# Patient Record
Sex: FEMALE | Race: WHITE | NOT HISPANIC OR LATINO | Employment: OTHER | URBAN - NONMETROPOLITAN AREA
[De-identification: names, ages, dates, MRNs, and addresses within clinical notes are randomized per-mention and may not be internally consistent; named-entity substitution may affect disease eponyms.]

---

## 2024-09-16 ENCOUNTER — OFFICE VISIT (OUTPATIENT)
Dept: CARDIOLOGY | Facility: CLINIC | Age: 74
End: 2024-09-16
Payer: MEDICARE

## 2024-09-16 VITALS
DIASTOLIC BLOOD PRESSURE: 64 MMHG | WEIGHT: 149 LBS | OXYGEN SATURATION: 95 % | BODY MASS INDEX: 27.42 KG/M2 | HEART RATE: 63 BPM | SYSTOLIC BLOOD PRESSURE: 138 MMHG | HEIGHT: 62 IN

## 2024-09-16 DIAGNOSIS — I10 ESSENTIAL HYPERTENSION: ICD-10-CM

## 2024-09-16 DIAGNOSIS — R00.2 PALPITATIONS: Primary | ICD-10-CM

## 2024-09-16 DIAGNOSIS — E78.2 MIXED HYPERLIPIDEMIA: ICD-10-CM

## 2024-09-16 DIAGNOSIS — R00.2 PALPITATIONS: ICD-10-CM

## 2024-09-16 DIAGNOSIS — I47.19 ATRIAL TACHYCARDIA: ICD-10-CM

## 2024-09-16 DIAGNOSIS — G89.29 CHRONIC CHEST PAIN WITH LOW TO MODERATE RISK FOR CAD: ICD-10-CM

## 2024-09-16 DIAGNOSIS — E03.9 HYPOTHYROIDISM (ACQUIRED): ICD-10-CM

## 2024-09-16 DIAGNOSIS — Z91.89 CHRONIC CHEST PAIN WITH LOW TO MODERATE RISK FOR CAD: ICD-10-CM

## 2024-09-16 DIAGNOSIS — R07.9 CHRONIC CHEST PAIN WITH LOW TO MODERATE RISK FOR CAD: ICD-10-CM

## 2024-09-16 PROCEDURE — 93000 ELECTROCARDIOGRAM COMPLETE: CPT | Performed by: INTERNAL MEDICINE

## 2024-09-16 PROCEDURE — 3075F SYST BP GE 130 - 139MM HG: CPT | Performed by: INTERNAL MEDICINE

## 2024-09-16 PROCEDURE — 3078F DIAST BP <80 MM HG: CPT | Performed by: INTERNAL MEDICINE

## 2024-09-16 PROCEDURE — 99204 OFFICE O/P NEW MOD 45 MIN: CPT | Performed by: INTERNAL MEDICINE

## 2024-09-16 RX ORDER — HYDROCODONE BITARTRATE AND ACETAMINOPHEN 7.5; 325 MG/1; MG/1
TABLET ORAL
COMMUNITY
Start: 2024-07-24

## 2024-09-16 RX ORDER — ALENDRONATE SODIUM 70 MG/1
70 TABLET ORAL
COMMUNITY
Start: 2024-07-24

## 2024-09-16 RX ORDER — MAGNESIUM OXIDE 400 MG/1
400 TABLET ORAL DAILY
COMMUNITY

## 2024-09-16 RX ORDER — DAPAGLIFLOZIN 10 MG/1
10 TABLET, FILM COATED ORAL DAILY
COMMUNITY
Start: 2024-09-03

## 2024-09-16 RX ORDER — LOSARTAN POTASSIUM 25 MG/1
25 TABLET ORAL DAILY
COMMUNITY
Start: 2024-07-11

## 2024-09-16 RX ORDER — INSULIN GLARGINE 100 [IU]/ML
20 INJECTION, SOLUTION SUBCUTANEOUS NIGHTLY
COMMUNITY
Start: 2024-07-25

## 2024-09-16 RX ORDER — CHOLECALCIFEROL (VITAMIN D3) 25 MCG
1000 TABLET ORAL DAILY
COMMUNITY

## 2024-09-16 RX ORDER — LEVOTHYROXINE SODIUM 50 UG/1
50 TABLET ORAL
COMMUNITY
Start: 2024-08-31

## 2024-09-16 RX ORDER — ATORVASTATIN CALCIUM 80 MG/1
80 TABLET, FILM COATED ORAL NIGHTLY
COMMUNITY
Start: 2024-08-01

## 2024-09-16 RX ORDER — SERTRALINE HYDROCHLORIDE 100 MG/1
100 TABLET, FILM COATED ORAL DAILY
COMMUNITY
Start: 2024-07-10

## 2024-09-16 RX ORDER — LINAGLIPTIN 5 MG/1
5 TABLET, FILM COATED ORAL DAILY
COMMUNITY
Start: 2024-07-01

## 2024-09-16 RX ORDER — TRAMADOL HYDROCHLORIDE 50 MG/1
50 TABLET ORAL
COMMUNITY
Start: 2024-07-24

## 2024-09-16 RX ORDER — GLIMEPIRIDE 2 MG/1
2 TABLET ORAL 2 TIMES DAILY
COMMUNITY
Start: 2024-08-11

## 2024-09-17 ENCOUNTER — PATIENT ROUNDING (BHMG ONLY) (OUTPATIENT)
Dept: CARDIOLOGY | Facility: CLINIC | Age: 74
End: 2024-09-17
Payer: MEDICARE

## 2024-10-11 ENCOUNTER — HOSPITAL ENCOUNTER (OUTPATIENT)
Dept: CARDIOLOGY | Facility: HOSPITAL | Age: 74
Discharge: HOME | End: 2024-10-11
Payer: MEDICARE

## 2024-10-11 ENCOUNTER — HOSPITAL ENCOUNTER (OUTPATIENT)
Dept: NUCLEAR MEDICINE | Facility: HOSPITAL | Age: 74
Discharge: HOME | End: 2024-10-11
Payer: MEDICARE

## 2024-10-11 DIAGNOSIS — R00.2 PALPITATIONS: ICD-10-CM

## 2024-10-11 DIAGNOSIS — Z91.89 CHRONIC CHEST PAIN WITH LOW TO MODERATE RISK FOR CAD: ICD-10-CM

## 2024-10-11 DIAGNOSIS — R07.9 CHRONIC CHEST PAIN WITH LOW TO MODERATE RISK FOR CAD: ICD-10-CM

## 2024-10-11 DIAGNOSIS — G89.29 CHRONIC CHEST PAIN WITH LOW TO MODERATE RISK FOR CAD: ICD-10-CM

## 2024-10-11 PROCEDURE — 0 TECHNETIUM SESTAMIBI: Performed by: INTERNAL MEDICINE

## 2024-10-11 PROCEDURE — 78452 HT MUSCLE IMAGE SPECT MULT: CPT

## 2024-10-11 PROCEDURE — 93306 TTE W/DOPPLER COMPLETE: CPT

## 2024-10-11 PROCEDURE — A9500 TC99M SESTAMIBI: HCPCS | Performed by: INTERNAL MEDICINE

## 2024-10-11 PROCEDURE — 93017 CV STRESS TEST TRACING ONLY: CPT

## 2024-10-11 PROCEDURE — 25010000002 REGADENOSON 0.4 MG/5ML SOLUTION: Performed by: INTERNAL MEDICINE

## 2024-10-11 RX ORDER — REGADENOSON 0.08 MG/ML
0.4 INJECTION, SOLUTION INTRAVENOUS
Status: COMPLETED | OUTPATIENT
Start: 2024-10-11 | End: 2024-10-11

## 2024-10-11 RX ADMIN — REGADENOSON 0.4 MG: 0.08 INJECTION, SOLUTION INTRAVENOUS at 09:27

## 2024-10-11 RX ADMIN — TECHNETIUM TC 99M SESTAMIBI 1 DOSE: 1 INJECTION INTRAVENOUS at 07:57

## 2024-10-11 RX ADMIN — TECHNETIUM TC 99M SESTAMIBI 1 DOSE: 1 INJECTION INTRAVENOUS at 09:27

## 2024-10-14 LAB
BH CV ECHO MEAS - ACS: 1.6 CM
BH CV ECHO MEAS - AO MAX PG: 8.1 MMHG
BH CV ECHO MEAS - AO MEAN PG: 4 MMHG
BH CV ECHO MEAS - AO ROOT DIAM: 2.7 CM
BH CV ECHO MEAS - AO V2 MAX: 142 CM/SEC
BH CV ECHO MEAS - AO V2 VTI: 34.5 CM
BH CV ECHO MEAS - AVA(I,D): 2.07 CM2
BH CV ECHO MEAS - EDV(CUBED): 79.5 ML
BH CV ECHO MEAS - EDV(MOD-SP2): 79.2 ML
BH CV ECHO MEAS - EDV(MOD-SP4): 65.1 ML
BH CV ECHO MEAS - EF(MOD-BP): 60.8 %
BH CV ECHO MEAS - EF(MOD-SP2): 62.5 %
BH CV ECHO MEAS - EF(MOD-SP4): 63.4 %
BH CV ECHO MEAS - ESV(CUBED): 22 ML
BH CV ECHO MEAS - ESV(MOD-SP2): 29.7 ML
BH CV ECHO MEAS - ESV(MOD-SP4): 23.8 ML
BH CV ECHO MEAS - FS: 34.9 %
BH CV ECHO MEAS - IVS/LVPW: 1 CM
BH CV ECHO MEAS - IVSD: 1.1 CM
BH CV ECHO MEAS - LA DIMENSION: 3.9 CM
BH CV ECHO MEAS - LAT PEAK E' VEL: 10 CM/SEC
BH CV ECHO MEAS - LV DIASTOLIC VOL/BSA (35-75): 38.6 CM2
BH CV ECHO MEAS - LV MASS(C)D: 162.9 GRAMS
BH CV ECHO MEAS - LV MAX PG: 3.9 MMHG
BH CV ECHO MEAS - LV MEAN PG: 2 MMHG
BH CV ECHO MEAS - LV SYSTOLIC VOL/BSA (12-30): 14.1 CM2
BH CV ECHO MEAS - LV V1 MAX: 98.8 CM/SEC
BH CV ECHO MEAS - LV V1 VTI: 22.7 CM
BH CV ECHO MEAS - LVIDD: 4.3 CM
BH CV ECHO MEAS - LVIDS: 2.8 CM
BH CV ECHO MEAS - LVOT AREA: 3.1 CM2
BH CV ECHO MEAS - LVOT DIAM: 2 CM
BH CV ECHO MEAS - LVPWD: 1.1 CM
BH CV ECHO MEAS - MED PEAK E' VEL: 7 CM/SEC
BH CV ECHO MEAS - MR MAX PG: 74.3 MMHG
BH CV ECHO MEAS - MR MAX VEL: 431 CM/SEC
BH CV ECHO MEAS - MV A MAX VEL: 91.7 CM/SEC
BH CV ECHO MEAS - MV DEC SLOPE: 489 CM/SEC2
BH CV ECHO MEAS - MV DEC TIME: 0.22 SEC
BH CV ECHO MEAS - MV E MAX VEL: 110 CM/SEC
BH CV ECHO MEAS - MV E/A: 1.2
BH CV ECHO MEAS - PA ACC TIME: 0.18 SEC
BH CV ECHO MEAS - RAP SYSTOLE: 5 MMHG
BH CV ECHO MEAS - RVSP: 50 MMHG
BH CV ECHO MEAS - SV(LVOT): 71.3 ML
BH CV ECHO MEAS - SV(MOD-SP2): 49.5 ML
BH CV ECHO MEAS - SV(MOD-SP4): 41.3 ML
BH CV ECHO MEAS - SVI(LVOT): 42.3 ML/M2
BH CV ECHO MEAS - SVI(MOD-SP2): 29.3 ML/M2
BH CV ECHO MEAS - SVI(MOD-SP4): 24.5 ML/M2
BH CV ECHO MEAS - TAPSE (>1.6): 2.6 CM
BH CV ECHO MEAS - TR MAX PG: 45 MMHG
BH CV ECHO MEAS - TR MAX VEL: 337 CM/SEC
BH CV ECHO MEASUREMENTS AVERAGE E/E' RATIO: 12.94
BH CV NUCLEAR PRIOR STUDY: 1
BH CV REST NUCLEAR ISOTOPE DOSE: 10.1 MCI
BH CV STRESS BP STAGE 1: NORMAL
BH CV STRESS COMMENTS STAGE 1: NORMAL
BH CV STRESS DOSE REGADENOSON STAGE 1: 0.4
BH CV STRESS DURATION MIN STAGE 1: 0
BH CV STRESS DURATION SEC STAGE 1: 10
BH CV STRESS HR STAGE 1: 81
BH CV STRESS NUCLEAR ISOTOPE DOSE: 32.3 MCI
BH CV STRESS PROTOCOL 1: NORMAL
BH CV STRESS RECOVERY BP: NORMAL MMHG
BH CV STRESS RECOVERY HR: 75 BPM
BH CV STRESS STAGE 1: 1
LEFT ATRIUM VOLUME INDEX: 43 ML/M2
LV EF NUC BP: 82 %
MAXIMAL PREDICTED HEART RATE: 149 BPM
PERCENT MAX PREDICTED HR: 54.36 %
STRESS BASELINE BP: NORMAL MMHG
STRESS BASELINE HR: 55 BPM
STRESS PERCENT HR: 64 %
STRESS POST PEAK BP: NORMAL MMHG
STRESS POST PEAK HR: 81 BPM
STRESS TARGET HR: 127 BPM

## 2024-10-15 ENCOUNTER — TELEPHONE (OUTPATIENT)
Dept: CARDIOLOGY | Facility: CLINIC | Age: 74
End: 2024-10-15
Payer: MEDICARE

## 2024-10-15 NOTE — TELEPHONE ENCOUNTER
Renetta Ross APRN [facility]  Stress and echo are ok, ensure she keep her scheduled follow up to discuss further      Called and given message per DAFNE CRUZ.  She v/u.

## 2024-10-30 ENCOUNTER — OFFICE VISIT (OUTPATIENT)
Dept: CARDIOLOGY | Facility: CLINIC | Age: 74
End: 2024-10-30
Payer: MEDICARE

## 2024-10-30 ENCOUNTER — TELEPHONE (OUTPATIENT)
Dept: CARDIOLOGY | Facility: CLINIC | Age: 74
End: 2024-10-30

## 2024-10-30 VITALS
HEART RATE: 76 BPM | HEIGHT: 62 IN | OXYGEN SATURATION: 95 % | SYSTOLIC BLOOD PRESSURE: 132 MMHG | WEIGHT: 149 LBS | DIASTOLIC BLOOD PRESSURE: 70 MMHG | BODY MASS INDEX: 27.42 KG/M2

## 2024-10-30 DIAGNOSIS — E78.2 MIXED HYPERLIPIDEMIA: ICD-10-CM

## 2024-10-30 DIAGNOSIS — I38 VALVULAR HEART DISEASE: ICD-10-CM

## 2024-10-30 DIAGNOSIS — I10 ESSENTIAL HYPERTENSION: ICD-10-CM

## 2024-10-30 DIAGNOSIS — I47.19 ATRIAL TACHYCARDIA: Primary | ICD-10-CM

## 2024-10-30 PROCEDURE — 3078F DIAST BP <80 MM HG: CPT | Performed by: INTERNAL MEDICINE

## 2024-10-30 PROCEDURE — 3075F SYST BP GE 130 - 139MM HG: CPT | Performed by: INTERNAL MEDICINE

## 2024-10-30 PROCEDURE — 99214 OFFICE O/P EST MOD 30 MIN: CPT | Performed by: INTERNAL MEDICINE

## 2024-10-30 RX ORDER — METOPROLOL SUCCINATE 25 MG/1
25 TABLET, EXTENDED RELEASE ORAL DAILY
Qty: 90 TABLET | Refills: 1 | Status: SHIPPED | OUTPATIENT
Start: 2024-10-30

## 2024-10-30 NOTE — LETTER
October 30, 2024     NANCY Yarbrough  [address]  [place] [address]    Patient: Meredith Landry   YOB: 1950   Date of Visit: 10/30/2024       Dear NANCY Yarbrough    Meredith Landry was in my office today. Below is a copy of my note.    If you have questions, please do not hesitate to call me. I look forward to following Meredith along with you.         Sincerely,        Conrado Donnelly MD        CC: No Recipients    No notes on file

## 2024-10-30 NOTE — TELEPHONE ENCOUNTER
----- Message from Conrado Donnelly sent at 10/30/2024  1:04 PM EDT -----  Holter monitor from PCP reviewed  It shows multiple short runs of atrial tachycardia.  Start metoprolol ER 25 mg daily.

## 2024-10-30 NOTE — PROGRESS NOTES
Harika Marsh received a viral test for COVID-19. They were educated on isolation and quarantine as appropriate. For any symptoms, they were directed to seek care from their PCP, given contact information to establish with a doctor, directed to an urgent care or the emergency room. subjective     Chief Complaint   Patient presents with    Results     Stress test and Echo    Palpitations     Follow up       Problems Addressed This Visit  1. Atrial tachycardia    2. Essential hypertension    3. Mixed hyperlipidemia    4. Mild mitral and tricuspid regurgitation, moderate pulmonary hypertension hypertension        History of Present Illness    Meredith is a 74 years old white female who complains of palpitations.  She had event monitor done in [facility] and was found to have multiple short runs of atrial tachycardia.  Rhythm strips were not available however there were obtained and reviewed.  It showed multiple short runs of atrial tachycardia.  Patient denies any syncope or presyncope.  Palpitations are mild but still some heart fluttering is ongoing.  She is not taking any medications.  Thyroid functions have been normal.  She has decreased caffeine and that has helped some      She also had complaint of shortness of breath and chest pain.  She underwent echo and stress test.  She is here for follow-up.      Past Surgical History:   Procedure Laterality Date    COLONOSCOPY  10/18/2024    GALLBLADDER SURGERY      TUBAL ABDOMINAL LIGATION       Family History   Problem Relation Age of Onset    Heart disease Mother     Heart disease Sister     Diabetes Sister     Heart disease Brother     Aortic aneurysm Brother      Past Medical History:   Diagnosis Date    Diabetes mellitus     Hypertension     Hypothyroid      Patient Active Problem List   Diagnosis    Palpitations    Atrial tachycardia    Mixed hyperlipidemia    Essential hypertension    Hypothyroidism (acquired)    Chronic chest pain with low to moderate risk for CAD    Mild mitral and tricuspid regurgitation, moderate pulmonary hypertension hypertension       Social History     Tobacco Use    Smoking status: Never   Vaping Use    Vaping status: Never Used   Substance Use Topics    Drug use: Never       No Known Allergies    Current  Outpatient Medications on File Prior to Visit   Medication Sig    alendronate (FOSAMAX) 70 MG tablet Take 1 tablet by mouth Every 7 (Seven) Days.    atorvastatin (LIPITOR) 80 MG tablet Take 1 tablet by mouth Every Night.    Cholecalciferol 25 MCG (1000 UT) tablet Take 1 tablet by mouth Daily.    Farxiga 10 MG tablet Take 10 mg by mouth Daily.    glimepiride (AMARYL) 2 MG tablet Take 1 tablet by mouth 2 (Two) Times a Day.    HYDROcodone-acetaminophen (NORCO) 7.5-325 MG per tablet     Lantus SoloStar 100 UNIT/ML injection pen Inject 20 Units under the skin into the appropriate area as directed Every Night.    levothyroxine (SYNTHROID, LEVOTHROID) 50 MCG tablet Take 1 tablet by mouth Every Morning.    losartan (COZAAR) 25 MG tablet Take 1 tablet by mouth Daily.    magnesium oxide (MAG-OX) 400 MG tablet Take 1 tablet by mouth Daily.    sertraline (ZOLOFT) 100 MG tablet Take 1 tablet by mouth Daily.    Tradjenta 5 MG tablet tablet Take 1 tablet by mouth Daily.    traMADol (ULTRAM) 50 MG tablet Take 1 tablet by mouth.     No current facility-administered medications on file prior to visit.     (Not in a hospital admission)      Results for orders placed during the hospital encounter of 10/11/24    Adult Transthoracic Echo Complete W/ Cont if Necessary Per Protocol    Interpretation Summary    Left ventricular systolic function is normal. Left ventricular ejection fraction appears to be 56 - 60%.    Left ventricular wall thickness is consistent with mild concentric hypertrophy.    Left ventricular diastolic function was normal.    Left atrial volume is moderately increased.    Mild mitral valve regurgitation is present.    Estimated right ventricular systolic pressure from tricuspid regurgitation is moderately elevated (45-55 mmHg).    Results for orders placed during the hospital encounter of 10/11/24    Stress Test With Myocardial Perfusion One Day    Interpretation Summary    Impressions are consistent with a low risk  "study.    Left ventricular ejection fraction is hyperdynamic (Calculated EF > 70%).    Small area of mild perfusion defect noted in the mid to basal anteroseptal segment that is fixed with normal wall motion and thickening likely RV insertion artifact.    Baseline ECG rhythm of sinus bradycardia with no ischemic changes.    Stress ECG with normal sinus rhythm with no stress-induced ischemic changes.    TID 1.11.      The following portions of the patient's history were reviewed and updated as appropriate: allergies, current medications, past family history, past medical history, past social history, past surgical history and problem list.    Review of Systems   Constitutional: Negative.   HENT: Negative.  Negative for congestion.    Eyes: Negative.    Cardiovascular: Negative.  Positive for palpitations. Negative for chest pain, cyanosis, dyspnea on exertion, irregular heartbeat, leg swelling, near-syncope, orthopnea, paroxysmal nocturnal dyspnea and syncope.   Respiratory: Negative.  Negative for shortness of breath.    Hematologic/Lymphatic: Negative.    Musculoskeletal: Negative.    Gastrointestinal: Negative.    Neurological: Negative.  Negative for headaches.          Objective:     /70 (BP Location: Left arm, Patient Position: Sitting, Cuff Size: Adult)   Pulse 76   Ht 157.5 cm (62\")   Wt 67.6 kg (149 lb)   SpO2 95%   BMI 27.25 kg/m²   Pulmonary:      Effort: Pulmonary effort is normal.      Breath sounds: Normal breath sounds. No stridor. No wheezing. No rhonchi. No rales.   Cardiovascular:      PMI at left midclavicular line. Normal rate. Regular rhythm. Normal S1. Normal S2.       Murmurs: There is no murmur.      No gallop.  No click. No rub.   Pulses:     Intact distal pulses.   Edema:     Peripheral edema absent.           Lab Review  No lab work this visit.          Procedures       I personally viewed and interpreted the patient's LAB data         Assessment:     1. Atrial tachycardia    2. " Essential hypertension    3. Mixed hyperlipidemia    4. Mild mitral and tricuspid regurgitation, moderate pulmonary hypertension hypertension          Plan:     Atrial tachycardia  Patient was advised to start Toprol-XL 25 mg daily.    Hypertension is very well-controlled  She will continue losartan 25 mg daily.    Hyperlipidemia  She will continue Lipitor under directions of her PCP.    Mild mitral and tricuspid regurgitation with moderate pulmonary hypertension noted.  Patient is asymptomatic.  There is no evidence of heart failure.  Will continue to follow.        No follow-ups on file.

## 2024-10-30 NOTE — LETTER
October 30, 2024     NANCY Yarbrough  [address]  [place] [address]    Patient: Meredith Landry   YOB: 1950   Date of Visit: 10/30/2024       Dear NANCY Yarbrough,    Meredith Landry was in my office today. Below are the relevant portions of my assessment and plan of care.           If you have questions, please do not hesitate to call me. I look forward to following Meredith along with you.         Sincerely,        Conrado Donnelly MD        CC: No Recipients

## 2024-10-30 NOTE — TELEPHONE ENCOUNTER
Conrado Donnelly MD Collins, Sheila D, MA  Holter monitor from PCP reviewed  It shows multiple short runs of atrial tachycardia.  Start metoprolol ER 25 mg daily.      Called and spoke with patient. Given message per Dr Donnelly.  She agreed and v/u.

## 2025-04-30 ENCOUNTER — OFFICE VISIT (OUTPATIENT)
Dept: CARDIOLOGY | Facility: CLINIC | Age: 75
End: 2025-04-30
Payer: MEDICARE

## 2025-04-30 VITALS
DIASTOLIC BLOOD PRESSURE: 62 MMHG | BODY MASS INDEX: 27.42 KG/M2 | WEIGHT: 149 LBS | HEIGHT: 62 IN | SYSTOLIC BLOOD PRESSURE: 106 MMHG | OXYGEN SATURATION: 93 % | HEART RATE: 73 BPM

## 2025-04-30 DIAGNOSIS — I47.19 ATRIAL TACHYCARDIA: Primary | ICD-10-CM

## 2025-04-30 DIAGNOSIS — I38 VALVULAR HEART DISEASE: ICD-10-CM

## 2025-04-30 DIAGNOSIS — E78.2 MIXED HYPERLIPIDEMIA: ICD-10-CM

## 2025-04-30 DIAGNOSIS — I10 ESSENTIAL HYPERTENSION: ICD-10-CM

## 2025-04-30 PROCEDURE — 3074F SYST BP LT 130 MM HG: CPT | Performed by: INTERNAL MEDICINE

## 2025-04-30 PROCEDURE — 3078F DIAST BP <80 MM HG: CPT | Performed by: INTERNAL MEDICINE

## 2025-04-30 PROCEDURE — 99214 OFFICE O/P EST MOD 30 MIN: CPT | Performed by: INTERNAL MEDICINE

## 2025-05-02 NOTE — PROGRESS NOTES
subjective     Chief Complaint   Patient presents with    Follow-up       Problems Addressed This Visit  1. Atrial tachycardia    2. Mixed hyperlipidemia    3. Essential hypertension    4. Mild mitral and tricuspid regurgitation, moderate pulmonary hypertension hypertension        History of Present Illness  History of Present Illness  The patient presents for evaluation of paroxysmal atrial tachycardia, hypertension, hyperlipidemia, mild mitral and tricuspid regurgitation, and diabetes.    She is doing very well with Toprol-XL 25 mg daily. At this time, she reports no episodes of palpitations, dizziness, syncope, or presyncope.    Her blood pressure is very well controlled. She will continue Toprol along with losartan.    She is following closely with her PCP NANCY Sullivan and is taking Lipitor. No recent lab work available. We will request a copy of lab work from PCP.    Mild mitral and tricuspid regurgitation, asymptomatic. We will continue to follow.    She is also diabetic and is trying to follow diet and is taking Farxiga, Amaryl, Lantus, and Tradjenta.    MEDICATIONS  Toprol-XL 25 mg daily, losartan, Lipitor, Farxiga, Amaryl, Lantus, Tradjenta      Patient also has history of hypertension, hyperlipidemia and mild mitral and tricuspid regurgitation.  Hypothyroidism on Synthroid replacement therapy.    Past Surgical History:   Procedure Laterality Date    COLONOSCOPY  10/18/2024    GALLBLADDER SURGERY      TUBAL ABDOMINAL LIGATION       Family History   Problem Relation Age of Onset    Heart disease Mother     Heart disease Sister     Diabetes Sister     Heart disease Brother     Aortic aneurysm Brother      Past Medical History:   Diagnosis Date    Diabetes mellitus     Hypertension     Hypothyroid      Patient Active Problem List   Diagnosis    Palpitations    Atrial tachycardia    Mixed hyperlipidemia    Essential hypertension    Hypothyroidism (acquired)    Chronic chest pain with low to moderate risk  for CAD    Mild mitral and tricuspid regurgitation, moderate pulmonary hypertension hypertension       Social History     Tobacco Use    Smoking status: Never     Passive exposure: Never    Smokeless tobacco: Never   Vaping Use    Vaping status: Never Used   Substance Use Topics    Alcohol use: Defer    Drug use: Never       No Known Allergies    Current Outpatient Medications on File Prior to Visit   Medication Sig    alendronate (FOSAMAX) 70 MG tablet Take 1 tablet by mouth Every 7 (Seven) Days.    atorvastatin (LIPITOR) 80 MG tablet Take 1 tablet by mouth Every Night.    Cholecalciferol 25 MCG (1000 UT) tablet Take 1 tablet by mouth Daily.    Farxiga 10 MG tablet Take 10 mg by mouth Daily.    glimepiride (AMARYL) 2 MG tablet Take 1 tablet by mouth 2 (Two) Times a Day.    HYDROcodone-acetaminophen (NORCO) 7.5-325 MG per tablet     Lantus SoloStar 100 UNIT/ML injection pen Inject 20 Units under the skin into the appropriate area as directed Every Night.    levothyroxine (SYNTHROID, LEVOTHROID) 50 MCG tablet Take 1 tablet by mouth Every Morning.    losartan (COZAAR) 25 MG tablet Take 1 tablet by mouth Daily.    magnesium oxide (MAG-OX) 400 MG tablet Take 1 tablet by mouth Daily.    metoprolol succinate XL (TOPROL-XL) 25 MG 24 hr tablet Take 1 tablet by mouth Daily.    sertraline (ZOLOFT) 100 MG tablet Take 1 tablet by mouth Daily.    Tradjenta 5 MG tablet tablet Take 1 tablet by mouth Daily.    traMADol (ULTRAM) 50 MG tablet Take 1 tablet by mouth.     No current facility-administered medications on file prior to visit.     (Not in a hospital admission)      Results for orders placed during the hospital encounter of 10/11/24    Adult Transthoracic Echo Complete W/ Cont if Necessary Per Protocol    Interpretation Summary    Left ventricular systolic function is normal. Left ventricular ejection fraction appears to be 56 - 60%.    Left ventricular wall thickness is consistent with mild concentric hypertrophy.    Left  "ventricular diastolic function was normal.    Left atrial volume is moderately increased.    Mild mitral valve regurgitation is present.    Estimated right ventricular systolic pressure from tricuspid regurgitation is moderately elevated (45-55 mmHg).    Results for orders placed during the hospital encounter of 10/11/24    Stress Test With Myocardial Perfusion One Day    Interpretation Summary    Impressions are consistent with a low risk study.    Left ventricular ejection fraction is hyperdynamic (Calculated EF > 70%).    Small area of mild perfusion defect noted in the mid to basal anteroseptal segment that is fixed with normal wall motion and thickening likely RV insertion artifact.    Baseline ECG rhythm of sinus bradycardia with no ischemic changes.    Stress ECG with normal sinus rhythm with no stress-induced ischemic changes.    TID 1.11.          The following portions of the patient's history were reviewed and updated as appropriate: allergies, current medications, past family history, past medical history, past social history, past surgical history and problem list.    Review of Systems   Constitutional: Negative.   HENT: Negative.  Negative for congestion.    Eyes: Negative.    Cardiovascular: Negative.  Negative for chest pain, cyanosis, dyspnea on exertion, irregular heartbeat, leg swelling, near-syncope, orthopnea, palpitations, paroxysmal nocturnal dyspnea and syncope.   Respiratory: Negative.  Negative for shortness of breath.    Hematologic/Lymphatic: Negative.    Musculoskeletal: Negative.    Gastrointestinal: Negative.    Neurological: Negative.  Negative for headaches.          Objective:     /62 (BP Location: Left arm, Patient Position: Sitting, Cuff Size: Adult)   Pulse 73   Ht 157.5 cm (62\")   Wt 67.6 kg (149 lb)   SpO2 93%   BMI 27.25 kg/m²   Pulmonary:      Effort: Pulmonary effort is normal.      Breath sounds: Normal breath sounds. No stridor. No wheezing. No rhonchi. No " rales.   Cardiovascular:      PMI at left midclavicular line. Normal rate. Regular rhythm. Normal S1. Normal S2.       Murmurs: There is no murmur.      No gallop.  No click. No rub.   Pulses:     Intact distal pulses.   Edema:     Peripheral edema absent.       Physical Exam        Lab Review  No recent lab work available.  Procedures    Results       I personally viewed and interpreted the patient's LAB data         Assessment:     1. Atrial tachycardia    2. Mixed hyperlipidemia    3. Essential hypertension    4. Mild mitral and tricuspid regurgitation, moderate pulmonary hypertension hypertension        Assessment & Plan  1. Paroxysmal atrial tachycardia.  She is doing very well with Toprol-XL 25 mg daily which was continued. At this time, she reports no episodes of palpitations, dizziness, syncope, or presyncope.    2. Hypertension.  Her blood pressure is very well controlled. She will continue Toprol along with losartan.    3. Hyperlipidemia.  She is following closely with her PCP NANCY Sullivan and is taking Lipitor. No recent lab work available. We will request a copy of lab work from PCP.    4. Mild mitral and tricuspid regurgitation.  She is asymptomatic. We will continue to follow.    5. Diabetes mellitus.  She is trying to follow diet and is taking Farxiga, Amaryl, Lantus, and Tradjenta.    Follow-up  She will be reevaluated in 6 months.               No follow-ups on file.